# Patient Record
Sex: FEMALE | Race: WHITE | NOT HISPANIC OR LATINO | Employment: FULL TIME | URBAN - METROPOLITAN AREA
[De-identification: names, ages, dates, MRNs, and addresses within clinical notes are randomized per-mention and may not be internally consistent; named-entity substitution may affect disease eponyms.]

---

## 2023-05-17 ENCOUNTER — OFFICE VISIT (OUTPATIENT)
Dept: PRIMARY CARE | Facility: CLINIC | Age: 23
End: 2023-05-17
Payer: COMMERCIAL

## 2023-05-17 VITALS
SYSTOLIC BLOOD PRESSURE: 122 MMHG | HEART RATE: 83 BPM | TEMPERATURE: 97.3 F | WEIGHT: 138 LBS | DIASTOLIC BLOOD PRESSURE: 70 MMHG | OXYGEN SATURATION: 100 %

## 2023-05-17 DIAGNOSIS — Z11.3 SCREEN FOR STD (SEXUALLY TRANSMITTED DISEASE): ICD-10-CM

## 2023-05-17 DIAGNOSIS — N76.0 ACUTE VAGINITIS: Primary | ICD-10-CM

## 2023-05-17 PROBLEM — K21.9 ESOPHAGEAL REFLUX: Status: ACTIVE | Noted: 2023-05-17

## 2023-05-17 PROCEDURE — 87661 TRICHOMONAS VAGINALIS AMPLIF: CPT

## 2023-05-17 PROCEDURE — 87591 N.GONORRHOEAE DNA AMP PROB: CPT

## 2023-05-17 PROCEDURE — 99214 OFFICE O/P EST MOD 30 MIN: CPT | Performed by: FAMILY MEDICINE

## 2023-05-17 PROCEDURE — 87491 CHLMYD TRACH DNA AMP PROBE: CPT

## 2023-05-17 PROCEDURE — 87205 SMEAR GRAM STAIN: CPT

## 2023-05-17 PROCEDURE — 1036F TOBACCO NON-USER: CPT | Performed by: FAMILY MEDICINE

## 2023-05-17 RX ORDER — NORGESTIMATE AND ETHINYL ESTRADIOL 0.25-0.035
1 KIT ORAL DAILY
COMMUNITY
End: 2023-05-17 | Stop reason: ALTCHOICE

## 2023-05-17 ASSESSMENT — ENCOUNTER SYMPTOMS
FEVER: 0
CHILLS: 0
COUGH: 0

## 2023-05-17 NOTE — PROGRESS NOTES
Subjective   Patient ID: Johana Jerez is a 22 y.o. female who presents for Vaginal Discharge and vaginal odor (X 1 day, discuss STD testing).    Patient would like to discus STD screening. Is sexually active. Over last day has noticed vaginal discharge. Discharge has odor. No pelvic pain. No vaginal bleeding.          Review of Systems   Constitutional:  Negative for chills and fever.   HENT:  Negative for congestion.    Respiratory:  Negative for cough.    Genitourinary:  Positive for vaginal discharge. Negative for pelvic pain, vaginal bleeding and vaginal pain.       Objective   /70   Pulse 83   Temp 36.3 °C (97.3 °F)   Wt 62.6 kg (138 lb)   SpO2 100%     Physical Exam  Exam conducted with a chaperone present.   Constitutional:       General: She is not in acute distress.     Appearance: Normal appearance.   HENT:      Head: Normocephalic.      Mouth/Throat:      Mouth: Mucous membranes are moist.   Eyes:      Extraocular Movements: Extraocular movements intact.      Conjunctiva/sclera: Conjunctivae normal.   Cardiovascular:      Rate and Rhythm: Normal rate and regular rhythm.      Heart sounds: No murmur heard.  Pulmonary:      Breath sounds: No wheezing or rhonchi.   Genitourinary:     Pubic Area: No rash.       Labia:         Right: No rash or lesion.         Left: No rash or lesion.       Vagina: Vaginal discharge present. No bleeding or lesions.      Cervix: No cervical motion tenderness.      Uterus: Not tender.    Musculoskeletal:      Cervical back: Neck supple.   Skin:     General: Skin is warm and dry.   Neurological:      Mental Status: She is alert.   Psychiatric:         Mood and Affect: Mood normal.         Behavior: Behavior normal.         Assessment/Plan   Diagnoses and all orders for this visit:  Acute vaginitis  -     C. Trachomatis / N. Gonorrhoeae, Amplified Detection; Future  -     TRICH VAGINALIS, AMPLIFIED; Future  -     Vaginitis Gram Stain For Bacterial Vaginosis +  Yeast; Future  Screen for STD (sexually transmitted disease)  -     C. Trachomatis / N. Gonorrhoeae, Amplified Detection; Future  -     TRICH VAGINALIS, AMPLIFIED; Future

## 2023-05-18 LAB
CHLAMYDIA TRACH., AMPLIFIED: NEGATIVE
CLUE CELLS: NORMAL
N. GONORRHEA, AMPLIFIED: NEGATIVE
NUGENT SCORE: 0
TRICHOMONAS VAGINALIS: NEGATIVE
YEAST: NORMAL

## 2023-05-19 ENCOUNTER — TELEPHONE (OUTPATIENT)
Dept: PRIMARY CARE | Facility: CLINIC | Age: 23
End: 2023-05-19
Payer: COMMERCIAL

## 2023-05-19 NOTE — TELEPHONE ENCOUNTER
----- Message from Rivka Desai DO sent at 5/19/2023 11:00 AM EDT -----  Please let patient know that all of her STD screening was negative.  Also the swabs for yeast and bacterial overgrowth were negative.

## 2023-05-23 NOTE — TELEPHONE ENCOUNTER
Called pt, unable to lm, vm full.    Attempted to call pt 3 times, unable to lm.  Please advise Thx

## 2023-08-29 ENCOUNTER — APPOINTMENT (OUTPATIENT)
Dept: PRIMARY CARE | Facility: CLINIC | Age: 23
End: 2023-08-29
Payer: COMMERCIAL

## 2023-09-06 ENCOUNTER — OFFICE VISIT (OUTPATIENT)
Dept: PRIMARY CARE | Facility: CLINIC | Age: 23
End: 2023-09-06
Payer: COMMERCIAL

## 2023-09-06 VITALS
HEART RATE: 64 BPM | DIASTOLIC BLOOD PRESSURE: 64 MMHG | SYSTOLIC BLOOD PRESSURE: 110 MMHG | TEMPERATURE: 97 F | WEIGHT: 146 LBS | OXYGEN SATURATION: 96 %

## 2023-09-06 DIAGNOSIS — Z32.01 POSITIVE PREGNANCY TEST (HHS-HCC): Primary | ICD-10-CM

## 2023-09-06 DIAGNOSIS — K59.00 CONSTIPATION, UNSPECIFIED CONSTIPATION TYPE: ICD-10-CM

## 2023-09-06 DIAGNOSIS — R10.30 LOWER ABDOMINAL PAIN: ICD-10-CM

## 2023-09-06 DIAGNOSIS — N92.6 MISSED MENSES: ICD-10-CM

## 2023-09-06 LAB — PREGNANCY TEST URINE, POC: POSITIVE

## 2023-09-06 PROCEDURE — 99214 OFFICE O/P EST MOD 30 MIN: CPT | Performed by: FAMILY MEDICINE

## 2023-09-06 PROCEDURE — 81025 URINE PREGNANCY TEST: CPT | Performed by: FAMILY MEDICINE

## 2023-09-06 PROCEDURE — 1036F TOBACCO NON-USER: CPT | Performed by: FAMILY MEDICINE

## 2023-09-06 ASSESSMENT — ENCOUNTER SYMPTOMS
DIARRHEA: 0
NAUSEA: 1
ABDOMINAL PAIN: 1
CONSTIPATION: 1
CHILLS: 0
FEVER: 0
COUGH: 0
SHORTNESS OF BREATH: 0

## 2023-09-06 NOTE — PROGRESS NOTES
Subjective   Patient ID: Johana Jerez is a 23 y.o. female who presents for Positive Pregnancy Test (Discuss).    Johana presents to discuss positive pregnancy test. She took home pregnancy test two weeks ago. and it was positive. She is unsure of exact date of LMP but believes it was around 23. Has been nauseated. Also is having pain in lower abdomen on right side. Feels like stabbing pain. Has been having intermittent constipation.              Review of Systems   Constitutional:  Negative for chills and fever.   Respiratory:  Negative for cough and shortness of breath.    Cardiovascular:  Negative for chest pain.   Gastrointestinal:  Positive for abdominal pain, constipation and nausea. Negative for diarrhea.       Objective   /64   Pulse 64   Temp 36.1 °C (97 °F)   Wt 66.2 kg (146 lb)   SpO2 96%     Physical Exam  Constitutional:       General: She is not in acute distress.     Appearance: Normal appearance.   HENT:      Head: Normocephalic.      Mouth/Throat:      Mouth: Mucous membranes are moist.   Eyes:      Extraocular Movements: Extraocular movements intact.      Conjunctiva/sclera: Conjunctivae normal.   Cardiovascular:      Rate and Rhythm: Normal rate and regular rhythm.      Heart sounds: No murmur heard.  Pulmonary:      Breath sounds: No wheezing or rhonchi.   Abdominal:      General: Bowel sounds are normal. There is no distension.      Palpations: Abdomen is soft.      Tenderness: There is abdominal tenderness (lower abdomen). There is no guarding or rebound.   Musculoskeletal:      Cervical back: Neck supple.   Skin:     General: Skin is warm and dry.   Neurological:      Mental Status: She is alert.   Psychiatric:         Mood and Affect: Mood normal.         Behavior: Behavior normal.         Assessment/Plan   Diagnoses and all orders for this visit:  Positive pregnancy test  Comments:  Patient unsure if going to keep baby. Gave resources for [facility].  Orders:  -     HCG, quantitative, pregnancy; Future  -     US OB < 14 weeks early; Future  Lower abdominal pain  Comments:  Check STAT US to r/o ectopic pregnancy.  Orders:  -     HCG, quantitative, pregnancy; Future  -     Comprehensive Metabolic Panel; Future  -     CBC and Auto Differential; Future  -     US OB < 14 weeks early; Future  Missed menses  -     POCT Pregnancy, Urine manually resulted  -     US OB < 14 weeks early; Future  Constipation, unspecified constipation type

## 2023-09-07 ENCOUNTER — TELEPHONE (OUTPATIENT)
Dept: PRIMARY CARE | Facility: CLINIC | Age: 23
End: 2023-09-07
Payer: COMMERCIAL

## 2023-09-08 NOTE — TELEPHONE ENCOUNTER
Called and spoke with pt- informed of provider appendage. Pt states she will CB if she wants the OB GYN referral but she is not sure what she is doing right now. Thanks, CG

## 2023-10-04 ENCOUNTER — TELEPHONE (OUTPATIENT)
Dept: PRIMARY CARE | Facility: CLINIC | Age: 23
End: 2023-10-04

## 2023-10-04 ENCOUNTER — LAB (OUTPATIENT)
Dept: LAB | Facility: LAB | Age: 23
End: 2023-10-04
Payer: COMMERCIAL

## 2023-10-04 ENCOUNTER — OFFICE VISIT (OUTPATIENT)
Dept: PRIMARY CARE | Facility: CLINIC | Age: 23
End: 2023-10-04
Payer: COMMERCIAL

## 2023-10-04 VITALS
DIASTOLIC BLOOD PRESSURE: 58 MMHG | TEMPERATURE: 96.6 F | SYSTOLIC BLOOD PRESSURE: 96 MMHG | WEIGHT: 145 LBS | HEART RATE: 89 BPM | OXYGEN SATURATION: 96 %

## 2023-10-04 DIAGNOSIS — O20.9 VAGINAL BLEEDING AFFECTING EARLY PREGNANCY (HHS-HCC): Primary | ICD-10-CM

## 2023-10-04 DIAGNOSIS — N30.01 ACUTE CYSTITIS WITH HEMATURIA: ICD-10-CM

## 2023-10-04 DIAGNOSIS — R10.30 LOWER ABDOMINAL PAIN: ICD-10-CM

## 2023-10-04 DIAGNOSIS — Z3A.10 10 WEEKS GESTATION OF PREGNANCY (HHS-HCC): ICD-10-CM

## 2023-10-04 DIAGNOSIS — Z32.01 POSITIVE PREGNANCY TEST (HHS-HCC): ICD-10-CM

## 2023-10-04 DIAGNOSIS — K59.00 CONSTIPATION, UNSPECIFIED CONSTIPATION TYPE: ICD-10-CM

## 2023-10-04 DIAGNOSIS — O09.31: ICD-10-CM

## 2023-10-04 DIAGNOSIS — O20.9 VAGINAL BLEEDING AFFECTING EARLY PREGNANCY (HHS-HCC): ICD-10-CM

## 2023-10-04 LAB
ABO GROUP (TYPE) IN BLOOD: NORMAL
BASOPHILS # BLD AUTO: 0.03 X10*3/UL (ref 0–0.1)
BASOPHILS NFR BLD AUTO: 0.3 %
EOSINOPHIL # BLD AUTO: 0.13 X10*3/UL (ref 0–0.7)
EOSINOPHIL NFR BLD AUTO: 1.1 %
ERYTHROCYTE [DISTWIDTH] IN BLOOD BY AUTOMATED COUNT: 12.2 % (ref 11.5–14.5)
FERRITIN SERPL-MCNC: 44 NG/ML (ref 8–150)
HCT VFR BLD AUTO: 35.6 % (ref 36–46)
HGB BLD-MCNC: 12.4 G/DL (ref 12–16)
IMM GRANULOCYTES # BLD AUTO: 0.05 X10*3/UL (ref 0–0.7)
IMM GRANULOCYTES NFR BLD AUTO: 0.4 % (ref 0–0.9)
LYMPHOCYTES # BLD AUTO: 3.25 X10*3/UL (ref 1.2–4.8)
LYMPHOCYTES NFR BLD AUTO: 28.2 %
MCH RBC QN AUTO: 31.9 PG (ref 26–34)
MCHC RBC AUTO-ENTMCNC: 34.8 G/DL (ref 32–36)
MCV RBC AUTO: 92 FL (ref 80–100)
MONOCYTES # BLD AUTO: 1.04 X10*3/UL (ref 0.1–1)
MONOCYTES NFR BLD AUTO: 9 %
NEUTROPHILS # BLD AUTO: 7.04 X10*3/UL (ref 1.2–7.7)
NEUTROPHILS NFR BLD AUTO: 61 %
NRBC BLD-RTO: 0 /100 WBCS (ref 0–0)
PLATELET # BLD AUTO: 339 X10*3/UL (ref 150–450)
PMV BLD AUTO: 10.3 FL (ref 7.5–11.5)
POC APPEARANCE, URINE: CLEAR
POC BILIRUBIN, URINE: NEGATIVE
POC BLOOD, URINE: ABNORMAL
POC COLOR, URINE: YELLOW
POC GLUCOSE, URINE: NEGATIVE MG/DL
POC KETONES, URINE: NEGATIVE MG/DL
POC LEUKOCYTES, URINE: NEGATIVE
POC NITRITE,URINE: POSITIVE
POC PH, URINE: 6.5 PH
POC PROTEIN, URINE: NEGATIVE MG/DL
POC SPECIFIC GRAVITY, URINE: 1.02
POC UROBILINOGEN, URINE: 0.2 EU/DL
RBC # BLD AUTO: 3.89 X10*6/UL (ref 4–5.2)
RH FACTOR (ANTIGEN D): NORMAL
WBC # BLD AUTO: 11.5 X10*3/UL (ref 4.4–11.3)

## 2023-10-04 PROCEDURE — 87186 SC STD MICRODIL/AGAR DIL: CPT

## 2023-10-04 PROCEDURE — 81003 URINALYSIS AUTO W/O SCOPE: CPT | Performed by: FAMILY MEDICINE

## 2023-10-04 PROCEDURE — 86901 BLOOD TYPING SEROLOGIC RH(D): CPT

## 2023-10-04 PROCEDURE — 36415 COLL VENOUS BLD VENIPUNCTURE: CPT

## 2023-10-04 PROCEDURE — 99214 OFFICE O/P EST MOD 30 MIN: CPT | Performed by: FAMILY MEDICINE

## 2023-10-04 PROCEDURE — 82728 ASSAY OF FERRITIN: CPT

## 2023-10-04 PROCEDURE — 83540 ASSAY OF IRON: CPT

## 2023-10-04 PROCEDURE — 83550 IRON BINDING TEST: CPT

## 2023-10-04 PROCEDURE — 84702 CHORIONIC GONADOTROPIN TEST: CPT

## 2023-10-04 PROCEDURE — 1036F TOBACCO NON-USER: CPT | Performed by: FAMILY MEDICINE

## 2023-10-04 PROCEDURE — 85025 COMPLETE CBC W/AUTO DIFF WBC: CPT

## 2023-10-04 PROCEDURE — 80053 COMPREHEN METABOLIC PANEL: CPT

## 2023-10-04 PROCEDURE — 86900 BLOOD TYPING SEROLOGIC ABO: CPT

## 2023-10-04 PROCEDURE — 87086 URINE CULTURE/COLONY COUNT: CPT

## 2023-10-04 RX ORDER — DOCUSATE SODIUM 100 MG/1
100 CAPSULE, LIQUID FILLED ORAL 2 TIMES DAILY PRN
Qty: 60 CAPSULE | Refills: 0 | Status: SHIPPED | OUTPATIENT
Start: 2023-10-04

## 2023-10-04 RX ORDER — AMOXICILLIN AND CLAVULANATE POTASSIUM 500; 125 MG/1; MG/1
500 TABLET, FILM COATED ORAL 2 TIMES DAILY
Qty: 10 TABLET | Refills: 0 | Status: SHIPPED | OUTPATIENT
Start: 2023-10-04 | End: 2023-10-11

## 2023-10-04 ASSESSMENT — ENCOUNTER SYMPTOMS
FEVER: 0
CONSTIPATION: 1
DYSURIA: 0
BLOOD IN STOOL: 0
CHILLS: 0

## 2023-10-04 NOTE — TELEPHONE ENCOUNTER
Patient is about 12 or 13 weeks pregnant and she is bleeding only when she wipes. She does not have OBGYN.  Asking what she should do?

## 2023-10-04 NOTE — PROGRESS NOTES
Subjective   Patient ID: Johana Jerez is a 23 y.o. female who presents for Pregnancy Problem (Discuss vaginal bleeding during pregnancy x1 day ).    Johana presents to discuss vaginal bleeding.  She is currently 10 weeks pregnant.  This morning when she used the restroom and wiped, she noticed some dark blood on the toilet paper.  Has not noticed any blood in the toilet.  Had a few cramps, but they are intermittent. Has not passed any tissue. She is scheduled for an  consult on Tuesday at [facility] in [place].     She has been feeling fatigued, but has not had any urinary symptoms.  She has been constipated for the last 4 days.  Has had some cramping.  Cramps are isolated and not persistent.  Was sexually active within the last 24 hours.         Review of Systems   Constitutional:  Negative for chills and fever.   Gastrointestinal:  Positive for constipation. Negative for blood in stool.   Genitourinary:  Positive for vaginal bleeding. Negative for dysuria, pelvic pain, urgency, vaginal discharge and vaginal pain.       Objective   BP 96/58   Pulse 89   Temp 35.9 °C (96.6 °F)   Wt 65.8 kg (145 lb)   SpO2 96%     Physical Exam  Constitutional:       General: She is not in acute distress.     Appearance: Normal appearance.   HENT:      Head: Normocephalic.      Mouth/Throat:      Mouth: Mucous membranes are moist.   Eyes:      Extraocular Movements: Extraocular movements intact.      Conjunctiva/sclera: Conjunctivae normal.   Cardiovascular:      Rate and Rhythm: Normal rate and regular rhythm.      Heart sounds: No murmur heard.  Pulmonary:      Breath sounds: No wheezing or rhonchi.   Abdominal:      General: There is no distension.      Palpations: Abdomen is soft.      Tenderness: There is no abdominal tenderness. There is no guarding.   Genitourinary:     Comments: Cervical os closed. No tissue visualized. Small amount of dark red blood present in vaginal vault.   Musculoskeletal:       Cervical back: Neck supple.   Skin:     General: Skin is warm and dry.   Neurological:      Mental Status: She is alert.   Psychiatric:         Mood and Affect: Mood normal.         Behavior: Behavior normal.         Assessment/Plan   Diagnoses and all orders for this visit:  Vaginal bleeding affecting early pregnancy  -     HCG, quantitative, pregnancy; Future  10 weeks gestation of pregnancy  -     HCG, quantitative, pregnancy; Future  -     ABO/Rh; Future  -     CBC and Auto Differential; Future  -     Ferritin; Future  -     Iron and TIBC; Future  -     POCT UA Automated manually resulted  -     Urine Culture; Future  No prenatal care in current pregnancy in first trimester  Acute cystitis with hematuria  -     amoxicillin-pot clavulanate (Augmentin) 500-125 mg tablet; Take 1 tablet (500 mg) by mouth 2 times a day for 5 days.  Constipation, unspecified constipation type  -     docusate sodium (Colace) 100 mg capsule; Take 1 capsule (100 mg) by mouth 2 times a day as needed for constipation.    Discussed with patient that vaginal bleeding may be an early sign of miscarriage.  There is currently no dilation of the cervical os and no tissue in the vault.  Recommend monitor symptoms.  If severe pain or large amount of bleeding with clots, patient should go to the ER.  Check hCG today, and plan to repeat on Friday.  Of note, the patient is planning an  consult on Tuesday at [facility] in [place].    Discussed possibility of urinary tract infection given nitrates in urine.  Start Augmentin.  Start docusate to help with constipation.  Increase fiber in diet.

## 2023-10-04 NOTE — LETTER
October 4, 2023     Patient: Johana Jerez   YOB: 2000   Date of Visit: 10/4/2023       To Whom It May Concern:    Johana Jerez was seen in my clinic on 10/4/2023 at 3:50 pm. Please excuse Johana for her absence from work on 10/5/23-10/6/23.    If you have any questions or concerns, please don't hesitate to call.         Sincerely,         Rivka Desai, DO

## 2023-10-05 LAB
ALBUMIN SERPL BCP-MCNC: 4.4 G/DL (ref 3.4–5)
ALP SERPL-CCNC: 73 U/L (ref 33–110)
ALT SERPL W P-5'-P-CCNC: 17 U/L (ref 7–45)
ANION GAP SERPL CALC-SCNC: 17 MMOL/L (ref 10–20)
AST SERPL W P-5'-P-CCNC: 15 U/L (ref 9–39)
B-HCG SERPL-ACNC: NORMAL MIU/ML
BILIRUB SERPL-MCNC: 0.2 MG/DL (ref 0–1.2)
BUN SERPL-MCNC: 10 MG/DL (ref 6–23)
CALCIUM SERPL-MCNC: 9.8 MG/DL (ref 8.6–10.6)
CHLORIDE SERPL-SCNC: 104 MMOL/L (ref 98–107)
CO2 SERPL-SCNC: 21 MMOL/L (ref 21–32)
CREAT SERPL-MCNC: 0.46 MG/DL (ref 0.5–1.05)
GFR SERPL CREATININE-BSD FRML MDRD: >90 ML/MIN/1.73M*2
GLUCOSE SERPL-MCNC: 90 MG/DL (ref 74–99)
IRON SATN MFR SERPL: 15 % (ref 25–45)
IRON SERPL-MCNC: 70 UG/DL (ref 35–150)
POTASSIUM SERPL-SCNC: 3.8 MMOL/L (ref 3.5–5.3)
PROT SERPL-MCNC: 7.4 G/DL (ref 6.4–8.2)
SODIUM SERPL-SCNC: 138 MMOL/L (ref 136–145)
TIBC SERPL-MCNC: 468 UG/DL (ref 240–445)
UIBC SERPL-MCNC: 398 UG/DL (ref 110–370)

## 2023-10-06 ENCOUNTER — TELEPHONE (OUTPATIENT)
Dept: PRIMARY CARE | Facility: CLINIC | Age: 23
End: 2023-10-06

## 2023-10-06 ENCOUNTER — APPOINTMENT (OUTPATIENT)
Dept: PRIMARY CARE | Facility: CLINIC | Age: 23
End: 2023-10-06
Payer: COMMERCIAL

## 2023-10-06 NOTE — TELEPHONE ENCOUNTER
Please reach out to patient. Has she has any further vaginal bleeding since our visit on Wednesday? Any cramping? Will decide next steps based on her symptoms.

## 2023-10-07 LAB — BACTERIA UR CULT: ABNORMAL

## 2023-10-09 NOTE — TELEPHONE ENCOUNTER
Reviewed. Will hold on further testing since no further bleeding. Patient seeing [facility] to discuss  tomorrow.

## 2023-10-11 ENCOUNTER — OFFICE VISIT (OUTPATIENT)
Dept: PRIMARY CARE | Facility: CLINIC | Age: 23
End: 2023-10-11
Payer: COMMERCIAL

## 2023-10-11 VITALS
TEMPERATURE: 97.7 F | HEART RATE: 105 BPM | DIASTOLIC BLOOD PRESSURE: 68 MMHG | BODY MASS INDEX: 24.32 KG/M2 | SYSTOLIC BLOOD PRESSURE: 110 MMHG | OXYGEN SATURATION: 97 % | WEIGHT: 146 LBS | HEIGHT: 65 IN

## 2023-10-11 DIAGNOSIS — Z00.00 WELLNESS EXAMINATION: Primary | ICD-10-CM

## 2023-10-11 PROCEDURE — 1036F TOBACCO NON-USER: CPT | Performed by: FAMILY MEDICINE

## 2023-10-11 PROCEDURE — 99395 PREV VISIT EST AGE 18-39: CPT | Performed by: FAMILY MEDICINE

## 2023-10-11 ASSESSMENT — ENCOUNTER SYMPTOMS
SORE THROAT: 0
FREQUENCY: 0
VOMITING: 0
SLEEP DISTURBANCE: 0
NAUSEA: 0
DIZZINESS: 0
DIARRHEA: 0
SHORTNESS OF BREATH: 0
FEVER: 0
RHINORRHEA: 0
NERVOUS/ANXIOUS: 0
FATIGUE: 0
ABDOMINAL PAIN: 0
CONSTIPATION: 0
APPETITE CHANGE: 0
COUGH: 0
CHILLS: 0
SINUS PRESSURE: 0
DYSURIA: 0

## 2023-10-11 NOTE — PROGRESS NOTES
"Subjective   Patient ID: Johana Jerez is a 23 y.o. female who presents for Annual Exam (Cpe. ).    Johana presents for wellness exam.    She did see [facility] yesterday.  scheduled for 10/23/23. Patient has not had any further vaginal bleeding since last appointment.          Review of Systems   Constitutional:  Negative for appetite change, chills, fatigue and fever.   HENT:  Negative for congestion, ear pain, postnasal drip, rhinorrhea, sinus pressure and sore throat.    Eyes:  Negative for visual disturbance.   Respiratory:  Negative for cough and shortness of breath.    Cardiovascular:  Negative for chest pain.   Gastrointestinal:  Negative for abdominal pain, constipation, diarrhea, nausea and vomiting.   Genitourinary:  Negative for dysuria and frequency.   Skin:  Negative for rash.   Neurological:  Negative for dizziness.   Psychiatric/Behavioral:  Negative for sleep disturbance. The patient is not nervous/anxious.        Objective   /68   Pulse 105   Temp 36.5 °C (97.7 °F)   Ht 1.651 m (5' 5\")   Wt 66.2 kg (146 lb)   SpO2 97%   BMI 24.30 kg/m²     Physical Exam  Constitutional:       General: She is not in acute distress.     Appearance: Normal appearance.   HENT:      Head: Normocephalic.      Nose: No congestion.      Mouth/Throat:      Mouth: Mucous membranes are moist.   Eyes:      Extraocular Movements: Extraocular movements intact.      Conjunctiva/sclera: Conjunctivae normal.   Cardiovascular:      Rate and Rhythm: Normal rate and regular rhythm.      Heart sounds: No murmur heard.  Pulmonary:      Effort: Pulmonary effort is normal.      Breath sounds: No wheezing or rhonchi.   Abdominal:      Palpations: Abdomen is soft.      Tenderness: There is no abdominal tenderness.   Musculoskeletal:         General: No swelling or tenderness.   Skin:     General: Skin is warm and dry.   Neurological:      General: No focal deficit present.      Mental Status: She is " alert.   Psychiatric:         Mood and Affect: Mood normal.         Behavior: Behavior normal.         Assessment/Plan   Diagnoses and all orders for this visit:  Wellness examination    CPE performed. Since no further vaginal bleeding since last appt, hold on further labs to recheck hcg. Patient scheduled for  10/23/23.

## 2023-12-18 ENCOUNTER — OFFICE VISIT (OUTPATIENT)
Dept: PRIMARY CARE | Facility: CLINIC | Age: 23
End: 2023-12-18
Payer: COMMERCIAL

## 2023-12-18 VITALS
WEIGHT: 151 LBS | SYSTOLIC BLOOD PRESSURE: 118 MMHG | TEMPERATURE: 97.3 F | BODY MASS INDEX: 25.13 KG/M2 | HEART RATE: 78 BPM | DIASTOLIC BLOOD PRESSURE: 72 MMHG | OXYGEN SATURATION: 99 %

## 2023-12-18 DIAGNOSIS — R10.30 LOWER ABDOMINAL PAIN: Primary | ICD-10-CM

## 2023-12-18 DIAGNOSIS — R82.90 ABNORMAL URINALYSIS: ICD-10-CM

## 2023-12-18 DIAGNOSIS — K59.00 CONSTIPATION, UNSPECIFIED CONSTIPATION TYPE: ICD-10-CM

## 2023-12-18 PROBLEM — Z3A.10 10 WEEKS GESTATION OF PREGNANCY (HHS-HCC): Status: RESOLVED | Noted: 2023-10-04 | Resolved: 2023-12-18

## 2023-12-18 LAB
POC APPEARANCE, URINE: CLEAR
POC BILIRUBIN, URINE: NEGATIVE
POC BLOOD, URINE: NEGATIVE
POC COLOR, URINE: YELLOW
POC GLUCOSE, URINE: NEGATIVE MG/DL
POC KETONES, URINE: NEGATIVE MG/DL
POC LEUKOCYTES, URINE: ABNORMAL
POC NITRITE,URINE: NEGATIVE
POC PH, URINE: 6 PH
POC PROTEIN, URINE: NEGATIVE MG/DL
POC SPECIFIC GRAVITY, URINE: 1.01
POC UROBILINOGEN, URINE: 0.2 EU/DL
PREGNANCY TEST URINE, POC: NEGATIVE

## 2023-12-18 PROCEDURE — 87086 URINE CULTURE/COLONY COUNT: CPT

## 2023-12-18 PROCEDURE — 99214 OFFICE O/P EST MOD 30 MIN: CPT | Performed by: FAMILY MEDICINE

## 2023-12-18 PROCEDURE — 81025 URINE PREGNANCY TEST: CPT | Performed by: FAMILY MEDICINE

## 2023-12-18 PROCEDURE — 1036F TOBACCO NON-USER: CPT | Performed by: FAMILY MEDICINE

## 2023-12-18 PROCEDURE — 81003 URINALYSIS AUTO W/O SCOPE: CPT | Performed by: FAMILY MEDICINE

## 2023-12-18 ASSESSMENT — ENCOUNTER SYMPTOMS
SHORTNESS OF BREATH: 0
FATIGUE: 0
CHILLS: 0
FEVER: 0
CONSTIPATION: 1
BLOOD IN STOOL: 1
DYSURIA: 0

## 2023-12-18 NOTE — PROGRESS NOTES
Subjective   Patient ID: Johana Jerez is a 23 y.o. female who presents for Abdominal Pain (Discuss lower abdominal and groin pain x 2 weeks. NKI).    Abilio presents with lower abdominal pain. It has been going on for about 2 weeks. Has not had any urinary frequency. No dysuria. Did have some blood in stool after bowel movement a few weeks ago. Has bowel movement every day or every other day. Stool can be hard.   No fevers or chills. History of  in October. No complications from procedure. Elected not to go on birth control and still prefers not to be on birth control.          Review of Systems   Constitutional:  Negative for chills, fatigue and fever.   Respiratory:  Negative for shortness of breath.    Cardiovascular:  Negative for chest pain.   Gastrointestinal:  Positive for blood in stool and constipation.   Genitourinary:  Negative for dysuria, vaginal bleeding and vaginal discharge.       Objective   /72   Pulse 78   Temp 36.3 °C (97.3 °F)   Wt 68.5 kg (151 lb)   SpO2 99%   BMI 25.13 kg/m²     Physical Exam  Constitutional:       General: She is not in acute distress.     Appearance: Normal appearance.   HENT:      Head: Normocephalic.      Mouth/Throat:      Mouth: Mucous membranes are moist.   Eyes:      Extraocular Movements: Extraocular movements intact.      Conjunctiva/sclera: Conjunctivae normal.   Cardiovascular:      Rate and Rhythm: Normal rate and regular rhythm.      Heart sounds: No murmur heard.  Pulmonary:      Breath sounds: No wheezing or rhonchi.   Abdominal:      General: There is no distension.      Tenderness: There is no abdominal tenderness. There is no guarding.   Musculoskeletal:      Cervical back: Neck supple.   Skin:     General: Skin is warm and dry.   Neurological:      Mental Status: She is alert.   Psychiatric:         Mood and Affect: Mood normal.         Behavior: Behavior normal.         Assessment/Plan   Diagnoses and all orders for this visit:  Lower  abdominal pain  Comments:  Suspect due to constipation. Check KUB. Restart stool softener.  Orders:  -     POCT UA Automated manually resulted  -     Urine Culture; Future  -     XR abdomen 1 view; Future  -     POCT pregnancy, urine manually resulted  Abnormal urinalysis  Comments:  Trace leukocytes on UA; urine sent for culture.  Orders:  -     Urine Culture; Future  Constipation, unspecified constipation type  -     XR abdomen 1 view; Future

## 2023-12-19 ENCOUNTER — ANCILLARY PROCEDURE (OUTPATIENT)
Dept: RADIOLOGY | Facility: CLINIC | Age: 23
End: 2023-12-19
Payer: COMMERCIAL

## 2023-12-19 DIAGNOSIS — R10.30 LOWER ABDOMINAL PAIN: ICD-10-CM

## 2023-12-19 DIAGNOSIS — K59.00 CONSTIPATION, UNSPECIFIED CONSTIPATION TYPE: ICD-10-CM

## 2023-12-19 LAB — BACTERIA UR CULT: NORMAL

## 2023-12-19 PROCEDURE — 74018 RADEX ABDOMEN 1 VIEW: CPT | Mod: FY

## 2023-12-19 PROCEDURE — 74018 RADEX ABDOMEN 1 VIEW: CPT | Performed by: RADIOLOGY

## 2023-12-20 ENCOUNTER — TELEPHONE (OUTPATIENT)
Dept: PRIMARY CARE | Facility: CLINIC | Age: 23
End: 2023-12-20
Payer: COMMERCIAL

## 2023-12-26 ENCOUNTER — APPOINTMENT (OUTPATIENT)
Dept: PRIMARY CARE | Facility: CLINIC | Age: 23
End: 2023-12-26
Payer: COMMERCIAL

## 2024-05-29 ENCOUNTER — HOSPITAL ENCOUNTER (OUTPATIENT)
Dept: RADIOLOGY | Facility: CLINIC | Age: 24
Discharge: HOME | End: 2024-05-29
Payer: COMMERCIAL

## 2024-05-29 ENCOUNTER — OFFICE VISIT (OUTPATIENT)
Dept: PRIMARY CARE | Facility: CLINIC | Age: 24
End: 2024-05-29
Payer: COMMERCIAL

## 2024-05-29 VITALS
OXYGEN SATURATION: 99 % | SYSTOLIC BLOOD PRESSURE: 96 MMHG | DIASTOLIC BLOOD PRESSURE: 64 MMHG | WEIGHT: 160 LBS | HEART RATE: 76 BPM | BODY MASS INDEX: 26.63 KG/M2 | TEMPERATURE: 97.1 F

## 2024-05-29 DIAGNOSIS — M79.672 LEFT FOOT PAIN: ICD-10-CM

## 2024-05-29 DIAGNOSIS — M79.672 LEFT FOOT PAIN: Primary | ICD-10-CM

## 2024-05-29 PROCEDURE — 73630 X-RAY EXAM OF FOOT: CPT | Mod: LT

## 2024-05-29 PROCEDURE — 99213 OFFICE O/P EST LOW 20 MIN: CPT | Performed by: FAMILY MEDICINE

## 2024-05-29 PROCEDURE — 73610 X-RAY EXAM OF ANKLE: CPT | Mod: LEFT SIDE | Performed by: RADIOLOGY

## 2024-05-29 PROCEDURE — 1036F TOBACCO NON-USER: CPT | Performed by: FAMILY MEDICINE

## 2024-05-29 PROCEDURE — 73630 X-RAY EXAM OF FOOT: CPT | Mod: LEFT SIDE | Performed by: RADIOLOGY

## 2024-05-29 PROCEDURE — 73610 X-RAY EXAM OF ANKLE: CPT | Mod: LT

## 2024-05-29 NOTE — PATIENT INSTRUCTIONS
Xray to rule out stress fracture, also discussed possible tendinitis, avoid treadmill, use ibuprofen. Trp PT is no improvement

## 2024-05-29 NOTE — PROGRESS NOTES
Assessment/Plan   ASSESSMENT/PLAN:      Patient Instructions   Xray to rule out stress fracture, also discussed possible tendinitis, avoid treadmill, use ibuprofen. Trp PT is no improvement     Zamzam Small DO     FUTURE DIRECTION:     Subjective   SUBJECTIVE:     Patient ID: Johana Jerez is a 23 y.o. femalefor the following:    Foot pain   States that left foot has been sharp pain fort he past two week   Worse in the mornings and with ambulation  Pain located on top foot and radiates up ankle   Works as , has been running on treadmill more frequently   Denies falling or trauma     Review of Systems    No Known Allergies      Current Outpatient Medications:     docusate sodium (Colace) 100 mg capsule, Take 1 capsule (100 mg) by mouth 2 times a day as needed for constipation. (Patient not taking: Reported on 5/29/2024), Disp: 60 capsule, Rfl: 0     Patient Active Problem List   Diagnosis    Esophageal reflux       Social History     Socioeconomic History    Marital status: Single     Spouse name: Not on file    Number of children: Not on file    Years of education: Not on file    Highest education level: Not on file   Occupational History    Not on file   Tobacco Use    Smoking status: Never    Smokeless tobacco: Never   Substance and Sexual Activity    Alcohol use: Not on file    Drug use: Not on file    Sexual activity: Not on file   Other Topics Concern    Not on file   Social History Narrative    Not on file     Social Determinants of Health     Financial Resource Strain: Not on file   Food Insecurity: Not on file   Transportation Needs: Not on file   Physical Activity: Not on file   Stress: Not on file   Social Connections: Not on file   Intimate Partner Violence: Not on file   Housing Stability: Not on file       Objective   OBJECTIVE:     Vitals:    05/29/24 1400   BP: 96/64   Pulse: 76   Temp: 36.2 °C (97.1 °F)   SpO2: 99%       Exam      Physical Exam  Constitutional:        Appearance: Normal appearance.   HENT:      Head: Normocephalic.   Pulmonary:      Effort: Pulmonary effort is normal.   Musculoskeletal:      Cervical back: Normal range of motion.      Left foot: Normal range of motion.        Feet:    Feet:      Comments: 5/5 strength, 2/4 Achilles  reflex, DP intact   Neurological:      Mental Status: She is alert.   Psychiatric:         Mood and Affect: Mood normal.

## 2024-05-31 ENCOUNTER — TELEPHONE (OUTPATIENT)
Dept: PRIMARY CARE | Facility: CLINIC | Age: 24
End: 2024-05-31
Payer: COMMERCIAL

## 2024-05-31 NOTE — TELEPHONE ENCOUNTER
----- Message from Zamzam Small DO sent at 5/31/2024  8:09 AM EDT -----  Please let pt know that Xray is normal

## 2024-10-05 NOTE — TELEPHONE ENCOUNTER
Please let patient know that her abdominal Xray confirmed constipation. Still moderate amount of stool retained in colon. Otherwise xray was normal.  Hope she is having some improvement with constipation after restarting stool softener. If not, may want to add Benefiber or Metamcuil in daily.    [Potential consequences of obesity discussed] : Potential consequences of obesity discussed [Benefits of weight loss discussed] : Benefits of weight loss discussed [Encouraged to maintain food diary] : Encouraged to maintain food diary [Encouraged to increase physical activity] : Encouraged to increase physical activity [Encouraged to use exercise tracking device] : Encouraged to use exercise tracking device [Weigh Self Weekly] : weigh self weekly [Decrease Portions] : decrease portions [____ min/wk Activity] : [unfilled] min/wk activity [Keep Food Diary] : keep food diary [Good understanding] : Patient has a good understanding of disease, goals and obesity follow-up plan [FreeTextEntry4] : 15

## 2025-01-21 ENCOUNTER — OFFICE VISIT (OUTPATIENT)
Dept: PRIMARY CARE | Facility: CLINIC | Age: 25
End: 2025-01-21
Payer: COMMERCIAL

## 2025-01-21 VITALS
DIASTOLIC BLOOD PRESSURE: 64 MMHG | TEMPERATURE: 97.1 F | BODY MASS INDEX: 26.29 KG/M2 | WEIGHT: 158 LBS | HEART RATE: 80 BPM | OXYGEN SATURATION: 100 % | SYSTOLIC BLOOD PRESSURE: 102 MMHG

## 2025-01-21 DIAGNOSIS — N30.00 ACUTE CYSTITIS WITHOUT HEMATURIA: Primary | ICD-10-CM

## 2025-01-21 DIAGNOSIS — Z30.011 INITIATION OF ORAL CONTRACEPTION: ICD-10-CM

## 2025-01-21 LAB
POC APPEARANCE, URINE: ABNORMAL
POC BILIRUBIN, URINE: NEGATIVE
POC BLOOD, URINE: NEGATIVE
POC COLOR, URINE: YELLOW
POC GLUCOSE, URINE: NEGATIVE MG/DL
POC KETONES, URINE: NEGATIVE MG/DL
POC LEUKOCYTES, URINE: ABNORMAL
POC NITRITE,URINE: POSITIVE
POC PH, URINE: 7 PH
POC PROTEIN, URINE: NEGATIVE MG/DL
POC SPECIFIC GRAVITY, URINE: 1.02
POC UROBILINOGEN, URINE: 0.2 EU/DL
PREGNANCY TEST URINE, POC: NEGATIVE

## 2025-01-21 PROCEDURE — 81003 URINALYSIS AUTO W/O SCOPE: CPT | Performed by: FAMILY MEDICINE

## 2025-01-21 PROCEDURE — 87086 URINE CULTURE/COLONY COUNT: CPT

## 2025-01-21 PROCEDURE — 87186 SC STD MICRODIL/AGAR DIL: CPT

## 2025-01-21 PROCEDURE — 1036F TOBACCO NON-USER: CPT | Performed by: FAMILY MEDICINE

## 2025-01-21 PROCEDURE — 99214 OFFICE O/P EST MOD 30 MIN: CPT | Performed by: FAMILY MEDICINE

## 2025-01-21 PROCEDURE — 81025 URINE PREGNANCY TEST: CPT | Performed by: FAMILY MEDICINE

## 2025-01-21 RX ORDER — NORGESTIMATE AND ETHINYL ESTRADIOL 0.25-0.035
1 KIT ORAL DAILY
Qty: 84 TABLET | Refills: 3 | Status: SHIPPED | OUTPATIENT
Start: 2025-01-21 | End: 2026-01-21

## 2025-01-21 RX ORDER — SULFAMETHOXAZOLE AND TRIMETHOPRIM 800; 160 MG/1; MG/1
1 TABLET ORAL 2 TIMES DAILY
Qty: 6 TABLET | Refills: 0 | Status: SHIPPED | OUTPATIENT
Start: 2025-01-21 | End: 2025-01-24

## 2025-01-21 ASSESSMENT — ENCOUNTER SYMPTOMS
COUGH: 0
FREQUENCY: 1
CHILLS: 0
FEVER: 0
DYSURIA: 1
HEMATURIA: 0
SHORTNESS OF BREATH: 0
DIFFICULTY URINATING: 0

## 2025-01-21 NOTE — PROGRESS NOTES
Subjective   Patient ID: Johana Jerez is a 24 y.o. female who presents for Urinary Frequency (And burning x on and off 3 days).    Johana has been having urinary symptoms since Saturday. Started with burning and increased frequency. Symptoms have been coming and going. Has not seen any blood in urine. No fevers or chills.    She also would like to restart birth control. Previously on OCP and did well. Lmp 12/31/24. Has been sexually active this cycle.            Review of Systems   Constitutional:  Negative for chills and fever.   Respiratory:  Negative for cough and shortness of breath.    Genitourinary:  Positive for dysuria and frequency. Negative for difficulty urinating, hematuria, menstrual problem, pelvic pain, urgency and vaginal discharge.       Objective    /64   Pulse 80   Temp 36.2 °C (97.1 °F)   Wt 71.7 kg (158 lb)   SpO2 100%   BMI 26.29 kg/m²     Physical Exam  Constitutional:       General: She is not in acute distress.     Appearance: Normal appearance.   HENT:      Head: Normocephalic.      Nose: No congestion or rhinorrhea.      Mouth/Throat:      Mouth: Mucous membranes are moist.   Cardiovascular:      Rate and Rhythm: Normal rate.   Pulmonary:      Effort: Pulmonary effort is normal.   Skin:     General: Skin is warm and dry.   Neurological:      General: No focal deficit present.      Mental Status: She is alert.   Psychiatric:         Mood and Affect: Mood normal.         Assessment/Plan   Diagnoses and all orders for this visit:  Acute cystitis without hematuria  Comments:  Start Bactrim. Urine sent for culture.  Orders:  -     POCT UA Automated manually resulted  -     Urine Culture; Future  -     sulfamethoxazole-trimethoprim (Bactrim DS) 800-160 mg tablet; Take 1 tablet by mouth 2 times a day for 3 days.  Initiation of oral contraception  Comments:  Recommend start OCP after next menses.  Orders:  -     POCT pregnancy, urine manually resulted  -     norgestimate-ethinyl  estradiol (Ortho-Cyclen) 0.25-35 mg-mcg tablet; Take 1 tablet by mouth once daily.    No contraindications to OCP including: uncontrolled HTN, hx VTE or CVA, migraine with aura, breast cancer or liver disease. Counseled that will need to use back-up method for 1 week. Discussed importance of taking pill at same time daily.

## 2025-01-24 ENCOUNTER — TELEPHONE (OUTPATIENT)
Dept: PRIMARY CARE | Facility: CLINIC | Age: 25
End: 2025-01-24
Payer: COMMERCIAL

## 2025-01-24 ENCOUNTER — OFFICE VISIT (OUTPATIENT)
Dept: URGENT CARE | Age: 25
End: 2025-01-24
Payer: COMMERCIAL

## 2025-01-24 VITALS
SYSTOLIC BLOOD PRESSURE: 126 MMHG | OXYGEN SATURATION: 98 % | TEMPERATURE: 98.1 F | HEART RATE: 80 BPM | DIASTOLIC BLOOD PRESSURE: 81 MMHG | RESPIRATION RATE: 20 BRPM

## 2025-01-24 DIAGNOSIS — R30.0 DYSURIA: ICD-10-CM

## 2025-01-24 DIAGNOSIS — N30.00 ACUTE CYSTITIS WITHOUT HEMATURIA: Primary | ICD-10-CM

## 2025-01-24 DIAGNOSIS — Z88.9 ALLERGY TO DRUG: ICD-10-CM

## 2025-01-24 LAB
BACTERIA UR CULT: ABNORMAL
POC APPEARANCE, URINE: CLEAR
POC BILIRUBIN, URINE: NEGATIVE
POC BLOOD, URINE: NEGATIVE
POC COLOR, URINE: YELLOW
POC GLUCOSE, URINE: NEGATIVE MG/DL
POC KETONES, URINE: NEGATIVE MG/DL
POC LEUKOCYTES, URINE: NEGATIVE
POC NITRITE,URINE: NEGATIVE
POC PH, URINE: 6 PH
POC PROTEIN, URINE: NEGATIVE MG/DL
POC SPECIFIC GRAVITY, URINE: >=1.03
POC UROBILINOGEN, URINE: 0.2 EU/DL
PREGNANCY TEST URINE, POC: NEGATIVE

## 2025-01-24 PROCEDURE — 87086 URINE CULTURE/COLONY COUNT: CPT

## 2025-01-24 RX ORDER — NITROFURANTOIN 25; 75 MG/1; MG/1
100 CAPSULE ORAL 2 TIMES DAILY
Qty: 14 CAPSULE | Refills: 0 | Status: SHIPPED | OUTPATIENT
Start: 2025-01-24 | End: 2025-01-31

## 2025-01-24 ASSESSMENT — ENCOUNTER SYMPTOMS
CHILLS: 0
BACK PAIN: 0
NAUSEA: 1
DIARRHEA: 0
HEMATURIA: 0
VOMITING: 0
SHORTNESS OF BREATH: 0
ABDOMINAL DISTENTION: 0
FLANK PAIN: 0
FLU SYMPTOMS: 1
FEVER: 0
FREQUENCY: 1
DYSURIA: 1

## 2025-01-24 NOTE — PROGRESS NOTES
Subjective   Patient ID: Johana Jerez is a 24 y.o. female. They present today with a chief complaint of Flu Symptoms (Pt advised that she is has had a fever, cough, abdominal pain, and myalgia. ), Difficulty Urinating (Pt was seen and treated 4 days prior for UTI. Pt is still having urinary frequency, urgency, and burning with urination. ), and Rash (Pt advised that she has had a rash on her arms. ).    History of Present Illness  Patient presents for ongoing uti symptoms including urgency, frequency, dysuria despite completing 3 day course of bactrim. Patient explains that after she took the first does of bactrim she began to feel unwell, spiked a fever and today she developed a erythematous rash on arms. Denies labored breathing, chest pain, sob. Patients culture came back for ecoli with susceptibility that was reviewed by myself. Denies hematuria, back pain, flank pain. Claims nausea after bactrim but denied previous nausea prior to taking medication.       Flu Symptoms  Presenting symptoms: nausea    Presenting symptoms: no diarrhea, no fever, no shortness of breath and no vomiting    Associated symptoms: no chills    Difficulty Urinating  Associated symptoms: nausea    Associated symptoms: no fever, no flank pain, no vaginal discharge and no vomiting    Rash  Pertinent negatives include no diarrhea, fever, shortness of breath or vomiting.       Past Medical History  Allergies as of 01/24/2025 - Reviewed 01/24/2025   Allergen Reaction Noted    Sulfamethoxazole-trimethoprim Rash and Fever 01/24/2025       (Not in a hospital admission)       No past medical history on file.    No past surgical history on file.     reports that she has never smoked. She has never used smokeless tobacco.    Review of Systems  Review of Systems   Constitutional:  Negative for chills and fever.   Respiratory:  Negative for shortness of breath.    Cardiovascular:  Negative for chest pain.   Gastrointestinal:  Positive for  nausea. Negative for abdominal distention, diarrhea and vomiting.   Genitourinary:  Positive for dysuria, frequency and urgency. Negative for flank pain, hematuria, vaginal bleeding, vaginal discharge and vaginal pain.   Musculoskeletal:  Negative for back pain.   Skin:  Positive for rash.                                  Objective    Vitals:    01/24/25 1558   BP: 126/81   Pulse: 80   Resp: 20   Temp: 36.7 °C (98.1 °F)   SpO2: 98%     No LMP recorded.    Physical Exam  Constitutional:       General: She is not in acute distress.     Appearance: She is not toxic-appearing.   Abdominal:      General: Abdomen is flat. There is no distension.      Palpations: Abdomen is soft.      Tenderness: There is abdominal tenderness in the suprapubic area. There is no right CVA tenderness, left CVA tenderness, guarding or rebound.   Skin:     Comments: Erythematous raised rash on arms b/l.    Neurological:      Mental Status: She is alert.         Procedures    Point of Care Test & Imaging Results from this visit  Results for orders placed or performed in visit on 01/24/25   POCT UA Automated manually resulted   Result Value Ref Range    POC Color, Urine Yellow Straw, Yellow, Light-Yellow    POC Appearance, Urine Clear Clear    POC Glucose, Urine NEGATIVE NEGATIVE mg/dl    POC Bilirubin, Urine NEGATIVE NEGATIVE    POC Ketones, Urine NEGATIVE NEGATIVE mg/dl    POC Specific Gravity, Urine >=1.030 1.005 - 1.035    POC Blood, Urine NEGATIVE NEGATIVE    POC PH, Urine 6.0 No Reference Range Established PH    POC Protein, Urine NEGATIVE NEGATIVE mg/dl    POC Urobilinogen, Urine 0.2 0.2, 1.0 EU/DL    Poc Nitrite, Urine NEGATIVE NEGATIVE    POC Leukocytes, Urine NEGATIVE NEGATIVE   POCT pregnancy, urine manually resulted   Result Value Ref Range    Preg Test, Ur Negative Negative      No results found.    Diagnostic study results (if any) were reviewed by Sugey Morocho PA-C.    Assessment/Plan   Allergies, medications, history, and  pertinent labs/EKGs/Imaging reviewed by Sugey Morocho PA-C.     Medical Decision Making  MDM- History, examination, and urine dipstick result are consistent with uncomplicated UTI without evidence of pyelonephritis or sepsis. Patient will be given antibiotic therapy and cultures pending. Advise to discontinue bactrim, based on new onset of fever, rash, nausea following bactrim use considering allergy to medication at this time and advised patient to consult pcp. Otherwise Supportive measures. Return to clinic or present to ED if symptoms change or worsen. Discussed signs and symptoms that would warrant er visit including back pain, fever, vomiting. Otherwise follow with PCP. Patient verbalized understanding and agrees with plan.       Orders and Diagnoses  Diagnoses and all orders for this visit:  Acute cystitis without hematuria  -     nitrofurantoin, macrocrystal-monohydrate, (Macrobid) 100 mg capsule; Take 1 capsule (100 mg) by mouth 2 times a day for 7 days.  Dysuria  -     POCT UA Automated manually resulted  -     POCT pregnancy, urine manually resulted  -     Urine Culture  Allergy to drug      Medical Admin Record      Patient disposition: Home    Electronically signed by Sugey Morocho PA-C  4:31 PM

## 2025-01-24 NOTE — TELEPHONE ENCOUNTER
Pt finished the UTI medication but is still feeling bad. Asking what next steps will be. Next available with any provider isn't until 1/28. Please advise

## 2025-01-24 NOTE — PATIENT INSTRUCTIONS
If you develop nausea, vomiting, back pain and fever go to ER    Drink plenty of fluids    Results will be in mychart from culture    Follow up with pcp.      I suspect that you may have an allergy to bactrim/sulfa. Discontinue use of this antibiotic.

## 2025-01-26 LAB — BACTERIA UR CULT: NO GROWTH

## 2025-04-22 ENCOUNTER — APPOINTMENT (OUTPATIENT)
Dept: PRIMARY CARE | Facility: CLINIC | Age: 25
End: 2025-04-22
Payer: COMMERCIAL

## 2025-04-28 ENCOUNTER — APPOINTMENT (OUTPATIENT)
Dept: PRIMARY CARE | Facility: CLINIC | Age: 25
End: 2025-04-28

## 2025-06-16 NOTE — TELEPHONE ENCOUNTER
Have you seen anything on this?  If Dr. Raygoza filled it out, I am not sure where it is.   Patient was informed of the providers message/wh

## 2025-07-01 ENCOUNTER — APPOINTMENT (OUTPATIENT)
Dept: PRIMARY CARE | Facility: CLINIC | Age: 25
End: 2025-07-01

## 2025-07-01 VITALS
DIASTOLIC BLOOD PRESSURE: 58 MMHG | TEMPERATURE: 97.9 F | BODY MASS INDEX: 26.46 KG/M2 | OXYGEN SATURATION: 99 % | SYSTOLIC BLOOD PRESSURE: 112 MMHG | WEIGHT: 159 LBS | HEART RATE: 70 BPM

## 2025-07-01 DIAGNOSIS — N64.4 BREAST PAIN, LEFT: ICD-10-CM

## 2025-07-01 DIAGNOSIS — Z01.419 ENCNTR FOR GYN EXAM (GENERAL) (ROUTINE) W/O ABN FINDINGS: Primary | ICD-10-CM

## 2025-07-01 DIAGNOSIS — Z11.3 SCREEN FOR STD (SEXUALLY TRANSMITTED DISEASE): ICD-10-CM

## 2025-07-01 DIAGNOSIS — Z12.4 SCREENING FOR CERVICAL CANCER: ICD-10-CM

## 2025-07-01 DIAGNOSIS — M79.605 LEFT LEG PAIN: ICD-10-CM

## 2025-07-01 PROCEDURE — 99395 PREV VISIT EST AGE 18-39: CPT | Performed by: FAMILY MEDICINE

## 2025-07-01 PROCEDURE — 99213 OFFICE O/P EST LOW 20 MIN: CPT | Performed by: FAMILY MEDICINE

## 2025-07-01 ASSESSMENT — ENCOUNTER SYMPTOMS
FREQUENCY: 0
DYSURIA: 0
ADENOPATHY: 0
COUGH: 0

## 2025-07-01 NOTE — PROGRESS NOTES
Subjective   Patient ID: Johana Jerez is a 24 y.o. female who presents for Gynecologic Exam.    Johana has been feeling well. She did recently stop her birth control because she did not like how she felt on it. Is sexually active. Prefers not to go on other birth control at this time. LMP 7/1/25.     She had left breast pain a few months ago. No nipple discharge. No lumps or bumps. Pain has completely resolved at this time.     Also has been having pain in her left lower leg. No injury but is more active at work. Did notice swelling one day, but it went away completely. Pain also has improved.          Review of Systems   HENT:  Negative for congestion.    Respiratory:  Negative for cough.    Genitourinary:  Negative for dysuria, frequency, pelvic pain, vaginal bleeding, vaginal discharge and vaginal pain.   Hematological:  Negative for adenopathy.       Objective   /58   Pulse 70   Temp 36.6 °C (97.9 °F)   Wt 72.1 kg (159 lb)   LMP 07/01/2025   SpO2 99%   BMI 26.46 kg/m²     Physical Exam  Exam conducted with a chaperone present.   Constitutional:       General: She is not in acute distress.     Appearance: Normal appearance.   HENT:      Head: Normocephalic.      Mouth/Throat:      Mouth: Mucous membranes are moist.   Eyes:      Extraocular Movements: Extraocular movements intact.      Conjunctiva/sclera: Conjunctivae normal.   Cardiovascular:      Rate and Rhythm: Normal rate and regular rhythm.      Heart sounds: No murmur heard.  Pulmonary:      Breath sounds: No wheezing or rhonchi.   Chest:   Breasts:     Right: No swelling, inverted nipple, mass, nipple discharge, skin change or tenderness.      Left: No swelling, inverted nipple, mass, nipple discharge, skin change or tenderness.   Genitourinary:     Pubic Area: No rash.       Labia:         Right: No rash or lesion.         Left: No rash or lesion.       Vagina: No vaginal discharge or lesions.      Cervix: No cervical motion  tenderness or discharge.      Uterus: Not tender.       Comments: Blood in vault  Musculoskeletal:      Cervical back: Neck supple.      Left lower leg: No deformity, tenderness or bony tenderness. No edema.   Skin:     General: Skin is warm and dry.   Neurological:      Mental Status: She is alert.   Psychiatric:         Mood and Affect: Mood normal.         Behavior: Behavior normal.         Assessment/Plan   Diagnoses and all orders for this visit:  Encntr for gyn exam (general) (routine) w/o abn findings  Comments:  Pap performed. STD testing requested.  Breast pain, left  Comments:  Since pain resolved, monitor for recurrence. If recurs, plan breast ultrasound to further evaluate.  Left leg pain  Comments:  Likely due to strain. Reviewed stretches for gastrocnemius and soleus muscles.  Screening for cervical cancer  -     THINPREP PAP TEST  Screen for STD (sexually transmitted disease)  -     C. trachomatis / N. gonorrhoeae, Amplified, Urogenital; Future  -     Trichomonas vaginalis, Amplified; Future  Other orders  -     Follow Up In Primary Care; Future

## 2025-07-22 LAB
CYTOLOGY CMNT CVX/VAG CYTO-IMP: NORMAL
LAB AP HPV GENOTYPE QUESTION: NO
LAB AP HPV HR: NORMAL
LABORATORY COMMENT REPORT: NORMAL
LMP START DATE: NORMAL
PATH REPORT.TOTAL CANCER: NORMAL

## 2025-07-28 ENCOUNTER — OFFICE VISIT (OUTPATIENT)
Dept: URGENT CARE | Age: 25
End: 2025-07-28
Payer: COMMERCIAL

## 2025-07-28 VITALS — DIASTOLIC BLOOD PRESSURE: 72 MMHG | HEART RATE: 74 BPM | SYSTOLIC BLOOD PRESSURE: 114 MMHG | OXYGEN SATURATION: 97 %

## 2025-07-28 DIAGNOSIS — R10.84 GENERALIZED ABDOMINAL PAIN: Primary | ICD-10-CM

## 2025-07-28 DIAGNOSIS — B82.9 PRESENCE OF PARASITES IN STOOL: ICD-10-CM

## 2025-07-28 LAB
POC APPEARANCE, URINE: ABNORMAL
POC BILIRUBIN, URINE: NEGATIVE
POC BLOOD, URINE: ABNORMAL
POC COLOR, URINE: YELLOW
POC GLUCOSE, URINE: NEGATIVE MG/DL
POC KETONES, URINE: NEGATIVE MG/DL
POC LEUKOCYTES, URINE: NEGATIVE
POC NITRITE,URINE: NEGATIVE
POC PH, URINE: 5.5 PH
POC PROTEIN, URINE: NEGATIVE MG/DL
POC SPECIFIC GRAVITY, URINE: 1.02
POC UROBILINOGEN, URINE: 0.2 EU/DL
PREGNANCY TEST URINE, POC: NEGATIVE

## 2025-07-28 PROCEDURE — 99214 OFFICE O/P EST MOD 30 MIN: CPT

## 2025-07-28 PROCEDURE — 81025 URINE PREGNANCY TEST: CPT

## 2025-07-28 PROCEDURE — 81003 URINALYSIS AUTO W/O SCOPE: CPT

## 2025-07-28 ASSESSMENT — ENCOUNTER SYMPTOMS
NAUSEA: 0
FLANK PAIN: 0
DIARRHEA: 1
FEVER: 0
SHORTNESS OF BREATH: 0
FREQUENCY: 0
BACK PAIN: 0
DYSURIA: 0
HEMATURIA: 0
ABDOMINAL DISTENTION: 0
ABDOMINAL PAIN: 1
CHILLS: 0
VOMITING: 0

## 2025-07-28 NOTE — PROGRESS NOTES
Subjective   Patient ID: Johana Jerez is a 24 y.o. female. They present today with a chief complaint of Abdominal Pain (Parasite in stool - concerned there is still a tape worm in her due to ab pain ).    History of Present Illness  4 year old female here with concern for a parasite infection. she explains that she went camping and has had several days of diarrhea since and noticed a worm looking thing in her stool. she has some abdominal pain a day ago that relieved with defecation.  Denies fever, chills, sweats. Denies n/v. Denies chest pain, sob. Denies blood in stool. Denies urinary urgency, frequency, burning with urination.         Abdominal Pain  Associated symptoms include diarrhea. Pertinent negatives include no dysuria, fever, frequency, hematuria, nausea or vomiting.       Past Medical History  Allergies as of 07/28/2025 - Reviewed 07/28/2025   Allergen Reaction Noted    Sulfamethoxazole-trimethoprim Rash and Fever 01/24/2025       Prescriptions Prior to Admission[1]     Medical History[2]    Surgical History[3]     reports that she has never smoked. She has never used smokeless tobacco.    Review of Systems  Review of Systems   Constitutional:  Negative for chills and fever.   Respiratory:  Negative for shortness of breath.    Cardiovascular:  Negative for chest pain.   Gastrointestinal:  Positive for abdominal pain and diarrhea. Negative for abdominal distention, nausea and vomiting.   Genitourinary:  Negative for dysuria, flank pain, frequency, hematuria, urgency, vaginal bleeding, vaginal discharge and vaginal pain.   Musculoskeletal:  Negative for back pain.                                  Objective    Vitals:    07/28/25 1305   BP: 114/72   Pulse: 74   SpO2: 97%     Patient's last menstrual period was 07/01/2025.    Physical Exam  Constitutional:       General: She is not in acute distress.     Appearance: She is not toxic-appearing.   HENT:      Head: Normocephalic and atraumatic.      Eyes:      Extraocular Movements: Extraocular movements intact.      Pupils: Pupils are equal, round, and reactive to light.     Pulmonary:      Effort: Pulmonary effort is normal. No respiratory distress.      Breath sounds: Normal breath sounds. No wheezing.   Abdominal:      General: Abdomen is flat. There is no distension.      Palpations: Abdomen is soft.      Tenderness: There is no abdominal tenderness. There is no right CVA tenderness, left CVA tenderness, guarding or rebound.     Musculoskeletal:      Cervical back: Normal range of motion.     Neurological:      Mental Status: She is alert.         Procedures    Point of Care Test & Imaging Results from this visit  Results for orders placed or performed in visit on 07/28/25   POCT pregnancy, urine manually resulted   Result Value Ref Range    Preg Test, Ur Negative Negative   POCT UA Automated manually resulted   Result Value Ref Range    POC Color, Urine Yellow Straw, Yellow, Light-Yellow    POC Appearance, Urine Cloudy (A) Clear    POC Glucose, Urine NEGATIVE NEGATIVE mg/dl    POC Bilirubin, Urine NEGATIVE NEGATIVE    POC Ketones, Urine NEGATIVE NEGATIVE mg/dl    POC Specific Gravity, Urine 1.020 1.005 - 1.035    POC Blood, Urine MODERATE (2+) (A) NEGATIVE    POC PH, Urine 5.5 No Reference Range Established PH    POC Protein, Urine NEGATIVE NEGATIVE mg/dl    POC Urobilinogen, Urine 0.2 0.2, 1.0 EU/DL    Poc Nitrite, Urine NEGATIVE NEGATIVE    POC Leukocytes, Urine NEGATIVE NEGATIVE      Imaging  No results found.    Cardiology, Vascular, and Other Imaging  No other imaging results found for the past 2 days      Diagnostic study results (if any) were reviewed by Sugey Morocho PA-C.    Assessment/Plan   Allergies, medications, history, and pertinent labs/EKGs/Imaging reviewed by Sugey Morocho PA-C.     Medical Decision Making  MDM- Patient with concern for parasitic infection causing diarrhea. physical exam reveals no point tenderness. UA and  HCG negative. at this time, I will send patient home with collection cups for ova/para testing and put in a referral to GI. I do not think she needs a higher level of care at this time and will give her warning signs that would warrant er visit. Urine culture also sent. Patient case reviewed with supervising physician, Dr Kennedy who agrees with plan.     Orders and Diagnoses  Diagnoses and all orders for this visit:  Generalized abdominal pain  -     POCT pregnancy, urine manually resulted  -     POCT UA Automated manually resulted  -     Urine Culture  -     Ova/Para + Giardia/Cryptosporidium Antigen; Future  -     Referral to Gastroenterology; Future  Presence of parasites in stool      Medical Admin Record      Patient disposition: Home    Electronically signed by Sugey Morocho PA-C  1:57 PM           [1] (Not in a hospital admission)   [2] No past medical history on file.  [3] No past surgical history on file.

## 2025-07-28 NOTE — PATIENT INSTRUCTIONS
At this time monitor for any new or worsening symptoms  If you develop new or worsening abdominal pain, vomiting, fever go to emergency room    At this time your physical exam is benign and does not show any alarming signs    Take the sample cups, within 30 min of collection please take to any  outpatient lab  [address], [place] [address]    GI referral placed

## 2025-07-30 LAB — BACTERIA UR CULT: NORMAL

## 2025-07-31 LAB
CRYPTOSP AG STL QL IA: NORMAL
G LAMBLIA AG STL QL IA: NORMAL
O+P STL CONC: NORMAL
O+P STL TRI STN: NORMAL

## 2025-08-12 LAB
CRYPTOSP AG STL QL IA: NORMAL
G LAMBLIA AG STL QL IA: NORMAL
O+P STL TRI STN: NORMAL

## 2025-09-16 ENCOUNTER — APPOINTMENT (OUTPATIENT)
Facility: CLINIC | Age: 25
End: 2025-09-16
Payer: COMMERCIAL

## 2026-07-01 ENCOUNTER — APPOINTMENT (OUTPATIENT)
Dept: PRIMARY CARE | Facility: CLINIC | Age: 26
End: 2026-07-01
Payer: COMMERCIAL